# Patient Record
Sex: MALE | Race: WHITE | Employment: OTHER | URBAN - METROPOLITAN AREA
[De-identification: names, ages, dates, MRNs, and addresses within clinical notes are randomized per-mention and may not be internally consistent; named-entity substitution may affect disease eponyms.]

---

## 2018-07-25 ENCOUNTER — OFFICE VISIT (OUTPATIENT)
Dept: FAMILY MEDICINE CLINIC | Age: 48
End: 2018-07-25

## 2018-07-25 VITALS
SYSTOLIC BLOOD PRESSURE: 118 MMHG | HEART RATE: 99 BPM | DIASTOLIC BLOOD PRESSURE: 80 MMHG | HEIGHT: 73 IN | WEIGHT: 135 LBS | RESPIRATION RATE: 14 BRPM | BODY MASS INDEX: 17.89 KG/M2 | OXYGEN SATURATION: 96 %

## 2018-07-25 DIAGNOSIS — Z72.0 TOBACCO USE: ICD-10-CM

## 2018-07-25 DIAGNOSIS — G47.00 INSOMNIA, UNSPECIFIED TYPE: ICD-10-CM

## 2018-07-25 DIAGNOSIS — F41.9 ANXIETY: ICD-10-CM

## 2018-07-25 DIAGNOSIS — G89.29 CHRONIC ABDOMINAL PAIN: Primary | ICD-10-CM

## 2018-07-25 DIAGNOSIS — S91.331A NAIL WOUND OF RIGHT FOOT, INITIAL ENCOUNTER: ICD-10-CM

## 2018-07-25 DIAGNOSIS — R10.9 CHRONIC ABDOMINAL PAIN: Primary | ICD-10-CM

## 2018-07-25 PROCEDURE — G8419 CALC BMI OUT NRM PARAM NOF/U: HCPCS | Performed by: FAMILY MEDICINE

## 2018-07-25 PROCEDURE — 99203 OFFICE O/P NEW LOW 30 MIN: CPT | Performed by: FAMILY MEDICINE

## 2018-07-25 PROCEDURE — G8427 DOCREV CUR MEDS BY ELIG CLIN: HCPCS | Performed by: FAMILY MEDICINE

## 2018-07-25 PROCEDURE — 4004F PT TOBACCO SCREEN RCVD TLK: CPT | Performed by: FAMILY MEDICINE

## 2018-07-25 RX ORDER — HYDROCODONE BITARTRATE AND ACETAMINOPHEN 5; 325 MG/1; MG/1
1 TABLET ORAL
COMMUNITY
Start: 2017-06-24 | End: 2018-07-25 | Stop reason: ALTCHOICE

## 2018-07-25 RX ORDER — ESOMEPRAZOLE MAGNESIUM 40 MG/1
CAPSULE, DELAYED RELEASE ORAL
Qty: 30 CAPSULE | Refills: 2 | Status: SHIPPED | OUTPATIENT
Start: 2018-07-25 | End: 2019-03-05 | Stop reason: SDUPTHER

## 2018-07-25 RX ORDER — ONDANSETRON 8 MG/1
4 TABLET, ORALLY DISINTEGRATING ORAL 2 TIMES DAILY
COMMUNITY
Start: 2017-06-24 | End: 2018-07-25 | Stop reason: SDUPTHER

## 2018-07-25 RX ORDER — ESOMEPRAZOLE MAGNESIUM 40 MG/1
CAPSULE, DELAYED RELEASE ORAL
COMMUNITY
Start: 2018-06-22 | End: 2018-07-25 | Stop reason: SDUPTHER

## 2018-07-25 RX ORDER — ONDANSETRON 8 MG/1
4 TABLET, ORALLY DISINTEGRATING ORAL 2 TIMES DAILY
Qty: 30 TABLET | Refills: 2 | Status: SHIPPED | OUTPATIENT
Start: 2018-07-25 | End: 2019-08-23

## 2018-07-25 RX ORDER — OMEPRAZOLE 20 MG/1
40 CAPSULE, DELAYED RELEASE ORAL DAILY
Qty: 30 CAPSULE | OUTPATIENT
Start: 2018-07-25

## 2018-07-25 RX ORDER — OMEPRAZOLE 20 MG/1
40 CAPSULE, DELAYED RELEASE ORAL DAILY
COMMUNITY
End: 2019-08-23

## 2018-07-25 RX ORDER — MELOXICAM 15 MG/1
15 TABLET ORAL
COMMUNITY
Start: 2017-11-21 | End: 2018-07-25 | Stop reason: ALTCHOICE

## 2018-07-25 RX ORDER — DIVALPROEX SODIUM 500 MG/1
500 TABLET, DELAYED RELEASE ORAL
COMMUNITY
End: 2018-07-25 | Stop reason: ALTCHOICE

## 2018-07-25 ASSESSMENT — PATIENT HEALTH QUESTIONNAIRE - PHQ9
1. LITTLE INTEREST OR PLEASURE IN DOING THINGS: 0
2. FEELING DOWN, DEPRESSED OR HOPELESS: 0
SUM OF ALL RESPONSES TO PHQ9 QUESTIONS 1 & 2: 0
SUM OF ALL RESPONSES TO PHQ QUESTIONS 1-9: 0

## 2018-07-25 ASSESSMENT — ENCOUNTER SYMPTOMS
BACK PAIN: 1
ABDOMINAL PAIN: 1

## 2018-07-25 NOTE — PROGRESS NOTES
7/25/2018    This is a 41 y.o. male   Chief Complaint   Patient presents with    Established New Doctor     Stomach and back issues Losing weight and insomnia   . HPI  Pt presents today as a new pt to establish a PCP. He has a past medical history of allergic rhinitis, anxiety, bipolar disorder, chronic back pain, depression, and GERD. 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 Has a hx of sepsis that resulted in removal of some of his small and large intestines. Abdominal pain is worsening. Has chronic back pain that is stable. Also states that 4-5 months he was outside picking and stepped on a nail, pulled the nail out, and since then the area has hardened, painful when waking. UTD on his tetanus vaccine. Past Medical History:   Diagnosis Date    Allergic rhinitis     Anxiety     Bipolar disorder (HCC)     Chronic back pain     Depression     GERD (gastroesophageal reflux disease)        Past Surgical History:   Procedure Laterality Date    BACK SURGERY  2011    C1,2,3,4 fused together    HAND SURGERY  2010    SMALL INTESTINE SURGERY      STOMACH SURGERY  2015       Social History     Social History    Marital status: Single     Spouse name: N/A    Number of children: N/A    Years of education: N/A     Occupational History    Not on file. Social History Main Topics    Smoking status: Current Every Day Smoker     Packs/day: 1.00     Years: 25.00    Smokeless tobacco: Never Used    Alcohol use No    Drug use: Yes     Types: Marijuana      Comment: on occassion    Sexual activity: Yes     Partners: Female     Other Topics Concern    Not on file     Social History Narrative    No narrative on file       History reviewed. No pertinent family history.     Current Outpatient Prescriptions   Medication Sig Dispense Refill    omeprazole (PRILOSEC) 20 MG Skin:   0.25 cm raised crusted area on proximal base of right foot   Psychiatric: He has a normal mood and affect. His behavior is normal. Judgment and thought content normal.       Plan   Diagnosis Orders   1. Chronic abdominal pain  Xi Singh MD   2. Tobacco use     3. Insomnia, unspecified type  sertraline (ZOLOFT) 50 MG tablet   4. Anxiety  sertraline (ZOLOFT) 50 MG tablet   5. Nail wound of right foot, initial encounter  External Referral To Dermatology       Return in about 1 month (around 8/25/2018) for Zoloft F/U. Prior to Visit Medications    Medication Sig Taking?  Authorizing Provider   omeprazole (PRILOSEC) 20 MG delayed release capsule Take 40 mg by mouth Daily  Yes Historical Provider, MD   esomeprazole (NEXIUM) 40 MG delayed release capsule TAKE 1 TABLET BY MOUTH DAILY Yes Historical Provider, MD   ondansetron (ZOFRAN-ODT) 8 MG disintegrating tablet Take 4 mg by mouth 2 times daily  Yes Historical Provider, MD   sertraline (ZOLOFT) 50 MG tablet Take 1 tablet by mouth daily Yes Kady Nagel, DO

## 2018-11-12 DIAGNOSIS — F41.9 ANXIETY: ICD-10-CM

## 2018-11-12 DIAGNOSIS — G47.00 INSOMNIA, UNSPECIFIED TYPE: ICD-10-CM

## 2019-03-05 DIAGNOSIS — F41.9 ANXIETY: ICD-10-CM

## 2019-03-05 DIAGNOSIS — G47.00 INSOMNIA, UNSPECIFIED TYPE: ICD-10-CM

## 2019-03-05 RX ORDER — ESOMEPRAZOLE MAGNESIUM 40 MG/1
CAPSULE, DELAYED RELEASE ORAL
Qty: 30 CAPSULE | Refills: 2 | Status: SHIPPED | OUTPATIENT
Start: 2019-03-05 | End: 2019-05-28 | Stop reason: SDUPTHER

## 2019-05-28 RX ORDER — ESOMEPRAZOLE MAGNESIUM 40 MG/1
CAPSULE, DELAYED RELEASE ORAL
Qty: 30 CAPSULE | Refills: 2 | Status: SHIPPED | OUTPATIENT
Start: 2019-05-28 | End: 2019-08-23

## 2019-08-23 ENCOUNTER — OFFICE VISIT (OUTPATIENT)
Dept: FAMILY MEDICINE CLINIC | Age: 49
End: 2019-08-23
Payer: MEDICAID

## 2019-08-23 VITALS
WEIGHT: 140 LBS | DIASTOLIC BLOOD PRESSURE: 74 MMHG | BODY MASS INDEX: 18.55 KG/M2 | HEIGHT: 73 IN | SYSTOLIC BLOOD PRESSURE: 128 MMHG | OXYGEN SATURATION: 96 % | HEART RATE: 68 BPM | RESPIRATION RATE: 14 BRPM

## 2019-08-23 DIAGNOSIS — R10.9 CHRONIC ABDOMINAL PAIN: ICD-10-CM

## 2019-08-23 DIAGNOSIS — F41.9 ANXIETY: Primary | ICD-10-CM

## 2019-08-23 DIAGNOSIS — G89.29 CHRONIC ABDOMINAL PAIN: ICD-10-CM

## 2019-08-23 PROBLEM — K21.9 GERD (GASTROESOPHAGEAL REFLUX DISEASE): Status: ACTIVE | Noted: 2018-07-31

## 2019-08-23 PROBLEM — R11.0 NAUSEA: Status: ACTIVE | Noted: 2018-07-31

## 2019-08-23 PROBLEM — F41.1 GAD (GENERALIZED ANXIETY DISORDER): Status: ACTIVE | Noted: 2018-07-31

## 2019-08-23 PROCEDURE — G8419 CALC BMI OUT NRM PARAM NOF/U: HCPCS | Performed by: FAMILY MEDICINE

## 2019-08-23 PROCEDURE — G8427 DOCREV CUR MEDS BY ELIG CLIN: HCPCS | Performed by: FAMILY MEDICINE

## 2019-08-23 PROCEDURE — 4004F PT TOBACCO SCREEN RCVD TLK: CPT | Performed by: FAMILY MEDICINE

## 2019-08-23 PROCEDURE — 99214 OFFICE O/P EST MOD 30 MIN: CPT | Performed by: FAMILY MEDICINE

## 2019-08-23 RX ORDER — OMEPRAZOLE 20 MG/1
20 CAPSULE, DELAYED RELEASE ORAL DAILY
COMMUNITY
End: 2020-08-07 | Stop reason: SDUPTHER

## 2019-08-23 RX ORDER — HYDROXYZINE 50 MG/1
50 TABLET, FILM COATED ORAL EVERY 8 HOURS PRN
Qty: 30 TABLET | Refills: 0 | Status: SHIPPED | OUTPATIENT
Start: 2019-08-23 | End: 2019-09-02

## 2019-08-23 ASSESSMENT — ENCOUNTER SYMPTOMS: ABDOMINAL PAIN: 0

## 2019-08-23 ASSESSMENT — PATIENT HEALTH QUESTIONNAIRE - PHQ9
SUM OF ALL RESPONSES TO PHQ QUESTIONS 1-9: 0
1. LITTLE INTEREST OR PLEASURE IN DOING THINGS: 0
2. FEELING DOWN, DEPRESSED OR HOPELESS: 0
SUM OF ALL RESPONSES TO PHQ QUESTIONS 1-9: 0
SUM OF ALL RESPONSES TO PHQ9 QUESTIONS 1 & 2: 0

## 2019-08-23 NOTE — PROGRESS NOTES
8/23/2019    This is a 57 y.o. male   Chief Complaint   Patient presents with    GI Problem     follow up   . HPI  She presents today for follow-up for GI issues. States that his stress levels have increased, having problems eating and sleeping. Small things set him off, using medical marijuana which has helped him. No longer taking Zoloft 50 mg because it was not helping him, admits to not acting himself for the last 2 months. Tried neighbor's Andriy Orts.      Past Medical History:   Diagnosis Date    Allergic rhinitis     Anxiety     Bipolar disorder (HCC)     Chronic back pain     Depression     GERD (gastroesophageal reflux disease)        Past Surgical History:   Procedure Laterality Date    BACK SURGERY  2011    C1,2,3,4 fused together    HAND SURGERY  2010    SMALL INTESTINE SURGERY      STOMACH SURGERY  2015       Social History     Socioeconomic History    Marital status: Single     Spouse name: Not on file    Number of children: Not on file    Years of education: Not on file    Highest education level: Not on file   Occupational History    Not on file   Social Needs    Financial resource strain: Not on file    Food insecurity:     Worry: Not on file     Inability: Not on file    Transportation needs:     Medical: Not on file     Non-medical: Not on file   Tobacco Use    Smoking status: Current Every Day Smoker     Packs/day: 0.50     Years: 25.00     Pack years: 12.50    Smokeless tobacco: Never Used   Substance and Sexual Activity    Alcohol use: No    Drug use: Yes     Types: Marijuana     Comment: on occassion    Sexual activity: Yes     Partners: Female   Lifestyle    Physical activity:     Days per week: Not on file     Minutes per session: Not on file    Stress: Not on file   Relationships    Social connections:     Talks on phone: Not on file     Gets together: Not on file     Attends Christian service: Not on file     Active member of club or organization: Not on file

## 2019-09-06 RX ORDER — ESOMEPRAZOLE MAGNESIUM 40 MG/1
CAPSULE, DELAYED RELEASE ORAL
Qty: 30 CAPSULE | Refills: 2 | OUTPATIENT
Start: 2019-09-06

## 2019-10-02 DIAGNOSIS — F41.9 ANXIETY: ICD-10-CM

## 2019-10-02 DIAGNOSIS — G47.00 INSOMNIA, UNSPECIFIED TYPE: ICD-10-CM

## 2019-10-11 RX ORDER — ESOMEPRAZOLE MAGNESIUM 40 MG/1
CAPSULE, DELAYED RELEASE ORAL
Qty: 30 CAPSULE | Refills: 2 | Status: SHIPPED | OUTPATIENT
Start: 2019-10-11 | End: 2020-01-03

## 2020-02-27 RX ORDER — ESOMEPRAZOLE MAGNESIUM 40 MG/1
CAPSULE, DELAYED RELEASE ORAL
Qty: 30 CAPSULE | Refills: 1 | Status: SHIPPED | OUTPATIENT
Start: 2020-02-27 | End: 2020-04-21

## 2020-04-21 RX ORDER — ESOMEPRAZOLE MAGNESIUM 40 MG/1
CAPSULE, DELAYED RELEASE ORAL
Qty: 30 CAPSULE | Refills: 1 | Status: SHIPPED | OUTPATIENT
Start: 2020-04-21 | End: 2020-06-09 | Stop reason: ALTCHOICE

## 2020-06-09 ENCOUNTER — VIRTUAL VISIT (OUTPATIENT)
Dept: FAMILY MEDICINE CLINIC | Age: 50
End: 2020-06-09
Payer: MEDICAID

## 2020-06-09 PROCEDURE — 99213 OFFICE O/P EST LOW 20 MIN: CPT | Performed by: FAMILY MEDICINE

## 2020-06-09 RX ORDER — HYDROXYZINE HYDROCHLORIDE 25 MG/1
25 TABLET, FILM COATED ORAL EVERY 8 HOURS PRN
Qty: 30 TABLET | Refills: 1 | Status: SHIPPED | OUTPATIENT
Start: 2020-06-09 | End: 2020-06-19

## 2020-06-10 ENCOUNTER — TELEPHONE (OUTPATIENT)
Dept: FAMILY MEDICINE CLINIC | Age: 50
End: 2020-06-10

## 2020-06-10 NOTE — TELEPHONE ENCOUNTER
Called & gave pt info:    [id number] [facility] - Kiet Mcdonough, [address], [address] [facility], [facility]  Phone- [phone]

## 2020-08-07 RX ORDER — OMEPRAZOLE 20 MG/1
20 CAPSULE, DELAYED RELEASE ORAL DAILY
Qty: 30 CAPSULE | Refills: 0 | Status: CANCELLED | OUTPATIENT
Start: 2020-08-07

## 2020-08-09 RX ORDER — OMEPRAZOLE 20 MG/1
20 CAPSULE, DELAYED RELEASE ORAL DAILY
Qty: 30 CAPSULE | Refills: 2 | Status: SHIPPED | OUTPATIENT
Start: 2020-08-09 | End: 2020-09-03

## 2020-09-03 RX ORDER — OMEPRAZOLE 20 MG/1
CAPSULE, DELAYED RELEASE ORAL
Qty: 30 CAPSULE | Refills: 2 | Status: SHIPPED | OUTPATIENT
Start: 2020-09-03

## 2020-09-29 ENCOUNTER — TELEPHONE (OUTPATIENT)
Dept: FAMILY MEDICINE CLINIC | Age: 50
End: 2020-09-29

## 2020-09-29 ENCOUNTER — NURSE TRIAGE (OUTPATIENT)
Dept: OTHER | Facility: CLINIC | Age: 50
End: 2020-09-29

## 2020-09-29 RX ORDER — BACLOFEN 5 MG/1
5 TABLET ORAL 3 TIMES DAILY PRN
Qty: 6 TABLET | Refills: 0 | Status: SHIPPED | OUTPATIENT
Start: 2020-09-29 | End: 2020-10-01

## 2020-09-29 NOTE — TELEPHONE ENCOUNTER
6:32 PM received on call page from patient. He was upset that he had not heard back from the office. He has been having hiccups for 4 days straight. They are keeping him awake and he is having chest discomfort from all the hiccups. This has been an intermittent problem for the las4 years since his major abdominal surgery. The previous episode lasted a week and he had to be given \"a shot\" in the Ed which stopped the hiccups. He has not been able to get in to see the gastroenterologist. I recommended he go to the ED for evaluation and medication to help his hiccups. He again expressed frustration over not having heard earlier, but he will go get check out.

## 2020-09-29 NOTE — TELEPHONE ENCOUNTER
I prescribed Baclofen 5 mg 3 times a day and sent to patient's pharmacy on file. I'd like for him to make a virtual visit follow-up appointment as well. Thank you.

## 2020-11-17 ENCOUNTER — NURSE TRIAGE (OUTPATIENT)
Dept: OTHER | Facility: CLINIC | Age: 50
End: 2020-11-17

## 2020-11-17 ENCOUNTER — VIRTUAL VISIT (OUTPATIENT)
Dept: FAMILY MEDICINE CLINIC | Age: 50
End: 2020-11-17
Payer: MEDICAID

## 2020-11-17 PROCEDURE — 99214 OFFICE O/P EST MOD 30 MIN: CPT | Performed by: NURSE PRACTITIONER

## 2020-11-17 PROCEDURE — G8428 CUR MEDS NOT DOCUMENT: HCPCS | Performed by: NURSE PRACTITIONER

## 2020-11-17 RX ORDER — SULFAMETHOXAZOLE AND TRIMETHOPRIM 800; 160 MG/1; MG/1
1 TABLET ORAL 2 TIMES DAILY
Qty: 14 TABLET | Refills: 0 | Status: SHIPPED | OUTPATIENT
Start: 2020-11-17 | End: 2020-11-24

## 2020-11-17 ASSESSMENT — ENCOUNTER SYMPTOMS
BACK PAIN: 1
COUGH: 0
ABDOMINAL PAIN: 1
RESPIRATORY NEGATIVE: 1
NAUSEA: 1
SHORTNESS OF BREATH: 0
WHEEZING: 0

## 2020-11-17 NOTE — PATIENT INSTRUCTIONS
Bactrim, increase fluids    She will drop off a urine sample with Dr. Terrie Trujillo as well as make an office visit.   This is the second Urinary tract infection with hematuria, I suggested patient should see a urologist and he said he would think about it      We will copy this to Dr. Terrie Trujillo

## 2020-11-17 NOTE — PROGRESS NOTES
2020    TELEHEALTH EVALUATION -- Audio/Visual (During [id number]-[id number] public health emergency)    HPI:    Lucetta Bernheim (:  1970) has requested an audio/video evaluation for the following concern(s):sx UTI    Mr Mini Galvez is  53 yo pt of Dr Marky Bansal. He presents today with a 2-day symptom of hematuria-frequency and some pain. States he has had UTIs before and this is the same thing. Reviewing his chart in  he had a UA Urinary tract infection his urine showed a central see large about a blood negative for nitrates negative for leuks but positive for bacteria and he was treated as a Urinary tract infection. He saw Dr. Barak Hutton virtually  for dizziness that was in 2018 and then an office visit in 2019 for anxiety. He was admitted to Dr. Barak Hutton as a new patient 2018. I only saw that one instance of a Urinary tract infection with hematuria in  and now he has it again. I suggested to the patient that he might want to see a urologist to make sure there was not something going on other than urine infection. He stated he had seen a urologist in the past.  I will order Bactrim, explained patient to drink a large amount of water with this medication and to make an appointment with Dr. Barak Hutton after he is finished with the Bactrim, and he can drop off a urine sample at the time. Review of Systems   Respiratory: Negative. Negative for cough, shortness of breath and wheezing. Positive for smoking   Cardiovascular: Negative. History of duodenal ulcer and dyspepsia-PPI   Gastrointestinal: Positive for abdominal pain (Chronic) and nausea. GERD treated with a PPI   Genitourinary: Positive for difficulty urinating, frequency, hematuria and urgency. Last Urinary tract infection in  he had hematuria  Urinary tract infection today for patient's report and he states he has blood in it. Musculoskeletal: Positive for back pain.    Allergic/Immunologic: Positive for environmental allergies. Psychiatric/Behavioral: Positive for sleep disturbance. The patient is nervous/anxious. History of bipolar and depression no treatment       Prior to Visit Medications    Medication Sig Taking? Authorizing Provider   sulfamethoxazole-trimethoprim (BACTRIM DS;SEPTRA DS) 800-160 MG per tablet Take 1 tablet by mouth 2 times daily for 7 days Yes GREGG Sanchez - CNP   omeprazole (PRILOSEC) 20 MG delayed release capsule TAKE 1 CAPSULE BY MOUTH EVERY DAY  Mohini Mclaughlin DO       Social History     Tobacco Use    Smoking status: Current Every Day Smoker     Packs/day: 0.50     Years: 25.00     Pack years: 12.50    Smokeless tobacco: Never Used   Substance Use Topics    Alcohol use: No    Drug use: Yes     Types: Marijuana     Comment: on occassion            PHYSICAL EXAMINATION:  [ INSTRUCTIONS:  \"[x]\" Indicates a positive item  \"[]\" Indicates a negative item  -- DELETE ALL ITEMS NOT EXAMINED]  Vital Signs: (As obtained by patient/caregiver or practitioner observation)    Blood pressure-  Heart rate-    Respiratory rate-    Temperature-  Pulse oximetry-     Constitutional: [x] Appears well-developed and well-nourished [x] No apparent distress      [] Abnormal-   Mental status  [x] Alert and awake  [x] Oriented to person/place/time [x]Able to follow commands      Eyes:  EOM    []  Normal  [] Abnormal-  Sclera  [x]  Normal  [] Abnormal -         Discharge [x]  None visible  [] Abnormal -    HENT:   [x] Normocephalic, atraumatic.   [] Abnormal   [] Mouth/Throat: Mucous membranes are moist.     External Ears [x] Normal  [] Abnormal-     Neck: [x] No visualized mass     Pulmonary/Chest: [x] Respiratory effort normal.  [x] No visualized signs of difficulty breathing or respiratory distress        [] Abnormal-      Musculoskeletal:   [] Normal gait with no signs of ataxia         [x] Normal range of motion of neck        [] Abnormal-       Neurological:        [x] No for in-person clinic visit. Patient and provider were located at their individual homes. --GREGG Melo CNP on 11/17/2020 at 1:14 PM    An electronic signature was used to authenticate this note.

## 2020-11-17 NOTE — TELEPHONE ENCOUNTER
Reason for Disposition   All other males with painful urination, or patient wants to be seen    Answer Assessment - Initial Assessment Questions  1. SEVERITY: \"How bad is the pain? \"  (e.g., Scale 1-10; mild, moderate, or severe)    - MILD (1-3): complains slightly about urination hurting    - MODERATE (4-7): interferes with normal activities      - SEVERE (8-10): excruciating, unwilling or unable to urinate because of the pain      Mild    2. FREQUENCY: \"How many times have you had painful urination today? \"     None    3. PATTERN: \"Is pain present every time you urinate or just sometimes? \"    no    4. ONSET: \"When did the painful urination start? \"      Yesterday    5. FEVER: \"Do you have a fever? \" If so, ask: \"What is your temperature, how was it measured, and when did it start? \"  No    6. PAST UTI: \"Have you had a urine infection before? \" If so, ask: \"When was the last time? \" and \"What happened that time? \"      Yes, 2 years, antibiotic    7. CAUSE: \"What do you think is causing the painful urination? \"   Urinary tract infection    8. OTHER SYMPTOMS: \"Do you have any other symptoms? \" (e.g., flank pain, penile discharge, scrotal pain, blood in urine)     Blood in urine    Protocols used: URINATION PAIN - MALE-ADULT-[place]    Patient called pre-service center [facility] with red flag complaint. Brief description of triage:possible urinary tract infection    Triage indicates for patient to be seen today    Care advice provided, patient verbalizes understanding; denies any other questions or concerns; instructed to call back for any new or worsening symptoms. Writer provided warm transfer to [facility] for appointment scheduling. Attention Provider: Thank you for allowing me to participate in the care of your patient. The patient was connected to triage in response to information provided to the [facility].  Please do not respond through this encounter as the response is not directed to a shared pool.

## 2020-11-21 ENCOUNTER — TELEPHONE (OUTPATIENT)
Dept: FAMILY MEDICINE CLINIC | Age: 50
End: 2020-11-21

## 2020-11-21 ENCOUNTER — NURSE TRIAGE (OUTPATIENT)
Dept: OTHER | Facility: CLINIC | Age: 50
End: 2020-11-21

## 2020-11-21 NOTE — TELEPHONE ENCOUNTER
friends nearby who you can talk to?\"          \"all I had was my mom and dad and my dog and he is dying too\"     7. THERAPIST: \"Do you have a counselor or therapist? Name?\"         8. STRESSORS: \"Has there been any new stress or recent changes in your life? \"        9. DRUG ABUSE/ALCOHOL: \"Do you drink alcohol or use any illegal drugs? \"        10. OTHER: \"Do you have any other health or medical symptoms right now? \" (e.g., fever)          11. PREGNANCY: \"Is there any chance you are pregnant? \" \"When was your last menstrual period? \"    Protocols used: DEPRESSION-ADULT-AH    Patient called pre-service center [facility]  with red flag complaint. Brief description of triage: see above     Triage indicates for patient to go to ED now for severe depression due to sudden loss of mom last week and dad being on hospice care. Pt is refusing. Pt just wants to see PCP. Office is closed and writer explained that to the pt, but he still wants an appt. Pt denies SI/HI at this time. Care advice provided, patient verbalizes understanding; denies any other questions or concerns; instructed to call back for any new or worsening symptoms. Writer provided warm transfer to Vinayak Urbano  at  [facility] for appointment scheduling as pt requested. Vinayak Urbano is also going to send a message high priority to the on call physician as well. Attention Provider: Thank you for allowing me to participate in the care of your patient. The patient was connected to triage in response to information provided to the [facility]. Please do not respond through this encounter as the response is not directed to a shared pool.

## 2020-11-23 ENCOUNTER — TELEPHONE (OUTPATIENT)
Dept: FAMILY MEDICINE CLINIC | Age: 50
End: 2020-11-23

## 2020-11-23 ENCOUNTER — VIRTUAL VISIT (OUTPATIENT)
Dept: FAMILY MEDICINE CLINIC | Age: 50
End: 2020-11-23
Payer: MEDICAID

## 2020-11-23 PROCEDURE — 99213 OFFICE O/P EST LOW 20 MIN: CPT | Performed by: NURSE PRACTITIONER

## 2020-11-23 PROCEDURE — G8427 DOCREV CUR MEDS BY ELIG CLIN: HCPCS | Performed by: NURSE PRACTITIONER

## 2020-11-23 RX ORDER — HYDROXYZINE HYDROCHLORIDE 25 MG/1
25 TABLET, FILM COATED ORAL EVERY 8 HOURS PRN
Qty: 30 TABLET | Refills: 0 | Status: SHIPPED | OUTPATIENT
Start: 2020-11-23 | End: 2020-11-24 | Stop reason: ALTCHOICE

## 2020-11-23 ASSESSMENT — PATIENT HEALTH QUESTIONNAIRE - PHQ9
1. LITTLE INTEREST OR PLEASURE IN DOING THINGS: 0
SUM OF ALL RESPONSES TO PHQ QUESTIONS 1-9: 1
2. FEELING DOWN, DEPRESSED OR HOPELESS: 1
SUM OF ALL RESPONSES TO PHQ QUESTIONS 1-9: 1
SUM OF ALL RESPONSES TO PHQ9 QUESTIONS 1 & 2: 1
SUM OF ALL RESPONSES TO PHQ QUESTIONS 1-9: 1

## 2020-11-23 NOTE — TELEPHONE ENCOUNTER
Jasper Edward  [facility] Clinical Staff               Subject: Appointment Request     Reason for Call: Semi-Routine Return from RN Triage     QUESTIONS   Type of Appointment? Established Patient   Reason for appointment request? No appointments available during search   Additional Information for Provider? Patient transferred from NT advising   patient needs to be seen today but he is refusing to go to the ER.   ---------------------------------------------------------------------------   --------------   CALL BACK INFO   What is the best way for the office to contact you? OK to leave message on   voicemail   Preferred Call Back Phone Number? [phone]   ---------------------------------------------------------------------------   --------------   SCRIPT ANSWERS   Patient needs to be seen within 5 days? Yes   Nurse Name? Gwinda Mohs   (Did RN indicate the need for Red Scheduling?)?  Yes

## 2020-11-23 NOTE — TELEPHONE ENCOUNTER
Per Gabbie tried calling pt to schedule for an appt. (anxiety/depression). Unable to lvm as mailbox was full.

## 2020-11-24 ENCOUNTER — OFFICE VISIT (OUTPATIENT)
Dept: FAMILY MEDICINE CLINIC | Age: 50
End: 2020-11-24

## 2020-11-24 VITALS
BODY MASS INDEX: 21 KG/M2 | OXYGEN SATURATION: 98 % | TEMPERATURE: 98.9 F | HEART RATE: 78 BPM | WEIGHT: 150 LBS | SYSTOLIC BLOOD PRESSURE: 124 MMHG | RESPIRATION RATE: 16 BRPM | DIASTOLIC BLOOD PRESSURE: 86 MMHG | HEIGHT: 71 IN

## 2020-11-24 PROCEDURE — G8420 CALC BMI NORM PARAMETERS: HCPCS | Performed by: FAMILY MEDICINE

## 2020-11-24 PROCEDURE — G8484 FLU IMMUNIZE NO ADMIN: HCPCS | Performed by: FAMILY MEDICINE

## 2020-11-24 PROCEDURE — 4004F PT TOBACCO SCREEN RCVD TLK: CPT | Performed by: FAMILY MEDICINE

## 2020-11-24 PROCEDURE — G8427 DOCREV CUR MEDS BY ELIG CLIN: HCPCS | Performed by: FAMILY MEDICINE

## 2020-11-24 ASSESSMENT — PATIENT HEALTH QUESTIONNAIRE - PHQ9
3. TROUBLE FALLING OR STAYING ASLEEP: 3
9. THOUGHTS THAT YOU WOULD BE BETTER OFF DEAD, OR OF HURTING YOURSELF: 0
2. FEELING DOWN, DEPRESSED OR HOPELESS: 3
4. FEELING TIRED OR HAVING LITTLE ENERGY: 3
7. TROUBLE CONCENTRATING ON THINGS, SUCH AS READING THE NEWSPAPER OR WATCHING TELEVISION: 3
1. LITTLE INTEREST OR PLEASURE IN DOING THINGS: 3
SUM OF ALL RESPONSES TO PHQ9 QUESTIONS 1 & 2: 6
SUM OF ALL RESPONSES TO PHQ QUESTIONS 1-9: 19
5. POOR APPETITE OR OVEREATING: 3
6. FEELING BAD ABOUT YOURSELF - OR THAT YOU ARE A FAILURE OR HAVE LET YOURSELF OR YOUR FAMILY DOWN: 1
SUM OF ALL RESPONSES TO PHQ QUESTIONS 1-9: 19
8. MOVING OR SPEAKING SO SLOWLY THAT OTHER PEOPLE COULD HAVE NOTICED. OR THE OPPOSITE, BEING SO FIGETY OR RESTLESS THAT YOU HAVE BEEN MOVING AROUND A LOT MORE THAN USUAL: 0
SUM OF ALL RESPONSES TO PHQ QUESTIONS 1-9: 19
10. IF YOU CHECKED OFF ANY PROBLEMS, HOW DIFFICULT HAVE THESE PROBLEMS MADE IT FOR YOU TO DO YOUR WORK, TAKE CARE OF THINGS AT HOME, OR GET ALONG WITH OTHER PEOPLE: 3

## 2020-11-24 ASSESSMENT — COLUMBIA-SUICIDE SEVERITY RATING SCALE - C-SSRS
6. HAVE YOU EVER DONE ANYTHING, STARTED TO DO ANYTHING, OR PREPARED TO DO ANYTHING TO END YOUR LIFE?: NO
1. WITHIN THE PAST MONTH, HAVE YOU WISHED YOU WERE DEAD OR WISHED YOU COULD GO TO SLEEP AND NOT WAKE UP?: NO
2. HAVE YOU ACTUALLY HAD ANY THOUGHTS OF KILLING YOURSELF?: NO

## 2020-11-24 ASSESSMENT — ENCOUNTER SYMPTOMS
BACK PAIN: 1
RESPIRATORY NEGATIVE: 1

## 2020-11-24 NOTE — PROGRESS NOTES
2020    This is a 52 y.o. male   Chief Complaint   Patient presents with    Anxiety    Depression   . HPI  Patient presents today for anxiety and depression. Patient's mother recently  and his dog is terminally ill. Also states that his father has a terminal illness. Patient has been on Atarax and Zoloft 50 mg in the past.  Was seen yesterday by [facility] via virtual visit. That he took a friend's Klonopin and felt better. Also states that he was told to come here today that I might be able to help him out. Offered patient Zoloft or increased dosage of Atarax, as well as counseling but patient refused all, called me an \"asshole\" and stated he would find another doctor.     Past Medical History:   Diagnosis Date    Allergic rhinitis     Anxiety     Bipolar disorder (HCC)     Chronic back pain     Depression     GERD (gastroesophageal reflux disease)        Past Surgical History:   Procedure Laterality Date    BACK SURGERY      C1,2,3,4 fused together    HAND SURGERY      SMALL INTESTINE SURGERY      STOMACH SURGERY         Social History     Socioeconomic History    Marital status: Single     Spouse name: Not on file    Number of children: Not on file    Years of education: Not on file    Highest education level: Not on file   Occupational History    Not on file   Social Needs    Financial resource strain: Not on file    Food insecurity     Worry: Not on file     Inability: Not on file    Transportation needs     Medical: Not on file     Non-medical: Not on file   Tobacco Use    Smoking status: Current Every Day Smoker     Packs/day: 0.50     Years: 25.00     Pack years: 12.50    Smokeless tobacco: Never Used   Substance and Sexual Activity    Alcohol use: No    Drug use: Yes     Types: Marijuana     Comment: on occassion    Sexual activity: Yes     Partners: Female   Lifestyle    Physical activity     Days per week: Not on file     Minutes per session: Not on file  Stress: Not on file   Relationships    Social connections     Talks on phone: Not on file     Gets together: Not on file     Attends Zoroastrianism service: Not on file     Active member of club or organization: Not on file     Attends meetings of clubs or organizations: Not on file     Relationship status: Not on file    Intimate partner violence     Fear of current or ex partner: Not on file     Emotionally abused: Not on file     Physically abused: Not on file     Forced sexual activity: Not on file   Other Topics Concern    Not on file   Social History Narrative    Not on file       History reviewed. No pertinent family history. Current Outpatient Medications   Medication Sig Dispense Refill    sulfamethoxazole-trimethoprim (BACTRIM DS;SEPTRA DS) 800-160 MG per tablet Take 1 tablet by mouth 2 times daily for 7 days 14 tablet 0    omeprazole (PRILOSEC) 20 MG delayed release capsule TAKE 1 CAPSULE BY MOUTH EVERY DAY 30 capsule 2     No current facility-administered medications for this visit.         Immunization History   Administered Date(s) Administered    Influenza, Intradermal, Preservative free 09/24/2020       Allergies   Allergen Reactions    No Known Allergies        Hospital Outpatient Visit on 03/28/2010   Component Date Value Ref Range Status    WBC 03/29/2010 6.7  4.0 - 11.0 X 1000 Final    RBC 03/29/2010 4.16* 4.2 - 5.9 X(10)6 Final    Hemoglobin 03/29/2010 14.1  13.5 - 17.5 gm/dl Final    Hematocrit 03/29/2010 41.4  40.5 - 52.5 % Final    MCV 03/29/2010 99.6  80 - 100 fl Final    MCH 03/29/2010 34.0  26 - 34 pg Final    MCHC 03/29/2010 34.1  31 - 36 gm/dl Final    RDW 03/29/2010 13.8  11.5 - 14.5 % Final    Platelets 39/73/2425 246  135 - 450 X(10)3 Final    MPV 03/29/2010 7.2  5.0 - 10.5 fl Final    Segs Relative 03/29/2010 61.0  42.0 - 63.0 % Final    Lymphocytes % 03/29/2010 27.2  25.0 - 40.0 % Final    Monocytes % 03/29/2010 8.9* 0.0 - 8.0 % Final    Eosinophils % 03/29/2010 2.5  0.0 - 5.0 % Final    Basophils % 03/29/2010 0.4  0.0 - 2.0 % Final    GRANULOCYTE ABSOLUTE COUNT 03/29/2010 4.1  1.7 - 7.7 X(10)3 Final    Lymphocytes Absolute 03/29/2010 1.8  1.0 - 5.1 X(10)3 Final    Monocytes Absolute 03/29/2010 0.6  0.0 - 0.95 X(10)3 Final    Eosinophils Absolute 03/29/2010 0.2  0.0 - 0.6 X(10)3 Final    Basophils Absolute 03/29/2010 0.0  0.0 - 0.2 X(10)3 Final    Differential Type 03/29/2010 Auto   Final    Amylase 03/29/2010 45  25 - 115 U/L Final    Lipase 03/29/2010 35  5.6 - 51.3 U/L Final    Sodium 03/29/2010 138  136 - 145 mEq/L Final    Potassium 03/29/2010 3.8  3.5 - 5.1 mEq/L Final    Chloride 03/29/2010 107  99 - 110 mEq/L Final    CO2 03/29/2010 24  21 - 32 mEq/L Final    Glucose 03/29/2010 91  70 - 99 mg/dl Final    BUN 03/29/2010 17  7 - 18 mg/dl Final    CREATININE 03/29/2010 0.8* 0.9 - 1.3 mg/dl Final    Total Protein 03/29/2010 7.1  6.4 - 8.2 gm/dl Final    Alb 03/29/2010 4.5  3.4 - 5.0 gm/dl Final    Total Bilirubin 03/29/2010 0.35  0.0 - 1.0 mg/dl Final    Alkaline Phosphatase 03/29/2010 51  25 - 100 U/L Final    AST 03/29/2010 18  15 - 37 U/L Final    ALT 03/29/2010 25  10 - 40 U/L Final    Calcium 03/29/2010 10.0  8.3 - 10.6 mg/dl Final    GFR, Estimated 03/29/2010 >60  >60 mL/min/1.73m2 Final    GFR Est, /Amer 03/29/2010 >60  SEE BELOW Final    Comment: >60 mL/min/1.73m2                           EGFR, calc. for ages 25                           & older using the MDRD                           formula (not corrected                           for weight),is valid for                           stable renal function. Review of Systems   Psychiatric/Behavioral: Positive for agitation and dysphoric mood. The patient is nervous/anxious.         /86 (Site: Left Upper Arm, Position: Sitting)   Pulse 78   Temp 98.9 °F (37.2 °C) (Temporal)   Resp 16   Ht 5' 10.5\" (1.791 m)   Wt 150 lb (68 kg)   SpO2 98%   BMI 21.22 kg/m²     Physical Exam  Vitals signs reviewed. Constitutional:       General: He is in acute distress. Appearance: He is well-developed. HENT:      Head: Normocephalic and atraumatic. Eyes:      Pupils: Pupils are equal, round, and reactive to light. Neck:      Musculoskeletal: Normal range of motion. Cardiovascular:      Rate and Rhythm: Normal rate and regular rhythm. Heart sounds: Normal heart sounds. Pulmonary:      Effort: Pulmonary effort is normal.      Breath sounds: Normal breath sounds. Abdominal:      General: Bowel sounds are normal.      Tenderness: There is no abdominal tenderness. Neurological:      Mental Status: He is alert and oriented to person, place, and time. Psychiatric:         Behavior: Behavior normal.         Thought Content: Thought content normal.         Judgment: Judgment normal.         Plan   Diagnosis Orders   1. Anxiety     2. Depressed mood     3. Grieving     4. Positive depression screening       Tried to reason with patient as he was leaving but patient kept walking out the door. Patient will be dismissed from practice. Prior to Visit Medications    Medication Sig Taking?  Authorizing Provider   sulfamethoxazole-trimethoprim (BACTRIM DS;SEPTRA DS) 800-160 MG per tablet Take 1 tablet by mouth 2 times daily for 7 days Yes Fernando Weber APRN - CNP   omeprazole (PRILOSEC) 20 MG delayed release capsule TAKE 1 CAPSULE BY MOUTH EVERY DAY Yes Oralia Solorzano, DO

## 2020-11-24 NOTE — PROGRESS NOTES
2020    TELEHEALTH EVALUATION -- Audio/Visual (During NCOHE-32 public health emergency)    HPI:    Tracy Garza (:  1970) has requested an audio/video evaluation for the following concern(s): Calls for emotional problems. He has had several recent losses and having a hard time dealing with it. Mr. Umu Smith is a 53-year-old patient of Dr. Karina Torres. He presents today already tearful and crying over multiple losses in his life. States he lost his mother a week ago and his father was just given 100 time to live. Of significant importance to this patient his dog. He has terminal cancer. Mr. Umu Smith is trying to pay for his cancer treatments and all of his medicine. He tells me he does not have the money left but he will not let his dog diet. He spoke to the fact of how important this dog has been to him for the last 9 years. Stating things like he was at one time going to take his life and the dog saved him and brought him back. While crying he was begging me for something to help him deal with this. Kept saying anything just to help me get through this. I gave him hydroxyzine. He has an appointment with Dr. Asaf Birch either this week , feels that if he talks to Dr. Jyotsna Onofre it will help him very much. Review of Systems   Respiratory: Negative. Smoking history   Cardiovascular: Negative. Gastrointestinal:        History of GERD treated with a PPI   Musculoskeletal: Positive for back pain. Psychiatric/Behavioral: Positive for behavioral problems and dysphoric mood. The patient is nervous/anxious. No depression, bipolar disorder, anxiety-he was placed on hydroxyzine as needed       Prior to Visit Medications    Medication Sig Taking?  Authorizing Provider   hydrOXYzine (ATARAX) 25 MG tablet Take 1 tablet by mouth every 8 hours as needed for Itching Yes Kelvin Judd, APRN - CNP   sulfamethoxazole-trimethoprim (BACTRIM DS;SEPTRA DS) 800-160 MG per tablet Take 1 tablet by mouth 2 times daily for 7 days Yes GREGG Sanchez - CNP   omeprazole (PRILOSEC) 20 MG delayed release capsule TAKE 1 CAPSULE BY MOUTH EVERY DAY Yes Mohini Mclaughlin DO       Social History     Tobacco Use    Smoking status: Current Every Day Smoker     Packs/day: 0.50     Years: 25.00     Pack years: 12.50    Smokeless tobacco: Never Used   Substance Use Topics    Alcohol use: No    Drug use: Yes     Types: Marijuana     Comment: on occassion            PHYSICAL EXAMINATION:  [ INSTRUCTIONS:  \"[x]\" Indicates a positive item  \"[]\" Indicates a negative item  -- DELETE ALL ITEMS NOT EXAMINED]  Vital Signs: (As obtained by patient/caregiver or practitioner observation)    Blood pressure-  Heart rate-    Respiratory rate-    Temperature-  Pulse oximetry-     Constitutional: [] Appears well-developed and well-nourished [] No apparent distress      [x] Abnormal-crying going through situational depression   mental status  [x] Alert and awake  [x] Oriented to person/place/time []Able to follow commands      Eyes:  EOM    []  Normal  [] Abnormal-  Sclera  [x]  Normal  [] Abnormal -         Discharge [x]  None visible  [] Abnormal -    HENT:   [x] Normocephalic, atraumatic.   [] Abnormal   [] Mouth/Throat: Mucous membranes are moist.     External Ears [] Normal  [] Abnormal-     Neck: [x] No visualized mass     Pulmonary/Chest: [x] Respiratory effort normal.  [x] No visualized signs of difficulty breathing or respiratory distress        [] Abnormal-      Musculoskeletal:   [] Normal gait with no signs of ataxia         [] Normal range of motion of neck        [] Abnormal-       Neurological:        [x] No Facial Asymmetry (Cranial nerve 7 motor function) (limited exam to video visit)          [] No gaze palsy        [] Abnormal-         Skin:        [x] No significant exanthematous lesions or discoloration noted on facial skin         [] Abnormal-            Psychiatric:       [x] Normal Affect [] No Hallucinations        [x] Abnormal-loss mother this week, father was given a gram diagnosis, and patient's dog of 9 years is dying from cancer. Other pertinent observable physical exam findings-     ASSESSMENT/PLAN:  1. Situational depression-difficult losses in patient's life. Most likely would benefit from an SSRI and CBT however patient would like to speak to his PCP first, has appointment tomorrow  Atarax given      Joey Davide is a 52 y.o. male being evaluated by a Virtual Visit (video visit) encounter to address concerns as mentioned above. A caregiver was present when appropriate. Due to this being a TeleHealth encounter (During VBAYF-21 public health emergency), evaluation of the following organ systems was limited: Vitals/Constitutional/EENT/Resp/CV/GI//MS/Neuro/Skin/Heme-Lymph-Imm. Pursuant to the emergency declaration under the [address] [place] [address], [address] [place] [address] authority and the [employer] Act, this Virtual Visit was conducted with patient's (and/or legal guardian's) consent, to reduce the patient's risk of exposure to COVID-19 and provide necessary medical care. The patient (and/or legal guardian) has also been advised to contact this office for worsening conditions or problems, and seek emergency medical treatment and/or call 911 if deemed necessary. Patient identification was verified at the start of the visit: YES    Total time spent on this encounter: 20      Services were provided through a video synchronous discussion virtually to substitute for in-person clinic visit. Patient and provider were located at their individual homes. --GREGG Ambrose - CNP on 11/24/2020 at 10:25 AM    An electronic signature was used to authenticate this note.

## 2020-12-19 RX ORDER — OMEPRAZOLE 20 MG/1
CAPSULE, DELAYED RELEASE ORAL
Qty: 90 CAPSULE | OUTPATIENT
Start: 2020-12-19